# Patient Record
Sex: MALE | Race: WHITE | Employment: FULL TIME | URBAN - METROPOLITAN AREA
[De-identification: names, ages, dates, MRNs, and addresses within clinical notes are randomized per-mention and may not be internally consistent; named-entity substitution may affect disease eponyms.]

---

## 2017-09-27 PROCEDURE — 84403 ASSAY OF TOTAL TESTOSTERONE: CPT | Performed by: INTERNAL MEDICINE

## 2018-12-27 PROCEDURE — 86803 HEPATITIS C AB TEST: CPT | Performed by: INTERNAL MEDICINE

## 2020-05-15 PROCEDURE — 88305 TISSUE EXAM BY PATHOLOGIST: CPT | Performed by: INTERNAL MEDICINE

## 2022-03-08 ENCOUNTER — APPOINTMENT (OUTPATIENT)
Dept: OTHER | Facility: HOSPITAL | Age: 57
End: 2022-03-08
Attending: PREVENTIVE MEDICINE

## 2023-06-01 ENCOUNTER — LAB REQUISITION (OUTPATIENT)
Dept: LAB | Facility: HOSPITAL | Age: 58
End: 2023-06-01
Payer: COMMERCIAL

## 2023-06-01 DIAGNOSIS — K64.9 UNSPECIFIED HEMORRHOIDS: ICD-10-CM

## 2023-06-01 PROCEDURE — 88304 TISSUE EXAM BY PATHOLOGIST: CPT | Performed by: SURGERY

## 2024-02-27 ENCOUNTER — APPOINTMENT (OUTPATIENT)
Dept: ULTRASOUND IMAGING | Age: 59
End: 2024-02-27
Attending: EMERGENCY MEDICINE
Payer: COMMERCIAL

## 2024-02-27 ENCOUNTER — HOSPITAL ENCOUNTER (EMERGENCY)
Age: 59
Discharge: HOME | End: 2024-02-28
Attending: EMERGENCY MEDICINE
Payer: COMMERCIAL

## 2024-02-27 DIAGNOSIS — I82.812 ACUTE SUPERFICIAL VENOUS THROMBOSIS OF LEFT LOWER EXTREMITY: Primary | ICD-10-CM

## 2024-02-27 LAB
ALBUMIN SERPL-MCNC: 4 G/DL (ref 3.4–5)
ALBUMIN/GLOB SERPL: 1.3 {RATIO} (ref 1–2)
ALP LIVER SERPL-CCNC: 25 U/L
ALT SERPL-CCNC: 42 U/L
ANION GAP SERPL CALC-SCNC: 5 MMOL/L (ref 0–18)
AST SERPL-CCNC: 28 U/L (ref 15–37)
BASOPHILS # BLD AUTO: 0.03 X10(3) UL (ref 0–0.2)
BASOPHILS NFR BLD AUTO: 0.4 %
BILIRUB SERPL-MCNC: 0.4 MG/DL (ref 0.1–2)
BUN BLD-MCNC: 15 MG/DL (ref 9–23)
CALCIUM BLD-MCNC: 9.4 MG/DL (ref 8.5–10.1)
CHLORIDE SERPL-SCNC: 105 MMOL/L (ref 98–112)
CO2 SERPL-SCNC: 29 MMOL/L (ref 21–32)
CREAT BLD-MCNC: 1.56 MG/DL
EGFRCR SERPLBLD CKD-EPI 2021: 51 ML/MIN/1.73M2 (ref 60–?)
EOSINOPHIL # BLD AUTO: 0.16 X10(3) UL (ref 0–0.7)
EOSINOPHIL NFR BLD AUTO: 2.1 %
ERYTHROCYTE [DISTWIDTH] IN BLOOD BY AUTOMATED COUNT: 17.1 %
GLOBULIN PLAS-MCNC: 3.2 G/DL (ref 2.8–4.4)
GLUCOSE BLD-MCNC: 118 MG/DL (ref 70–99)
HCT VFR BLD AUTO: 49.5 %
HGB BLD-MCNC: 16.1 G/DL
IMM GRANULOCYTES # BLD AUTO: 0.02 X10(3) UL (ref 0–1)
IMM GRANULOCYTES NFR BLD: 0.3 %
INR BLD: 2.44 (ref 0.8–1.2)
LYMPHOCYTES # BLD AUTO: 1.62 X10(3) UL (ref 1–4)
LYMPHOCYTES NFR BLD AUTO: 21.1 %
MCH RBC QN AUTO: 26.6 PG (ref 26–34)
MCHC RBC AUTO-ENTMCNC: 32.5 G/DL (ref 31–37)
MCV RBC AUTO: 81.8 FL
MONOCYTES # BLD AUTO: 0.78 X10(3) UL (ref 0.1–1)
MONOCYTES NFR BLD AUTO: 10.2 %
NEUTROPHILS # BLD AUTO: 5.05 X10 (3) UL (ref 1.5–7.7)
NEUTROPHILS # BLD AUTO: 5.05 X10(3) UL (ref 1.5–7.7)
NEUTROPHILS NFR BLD AUTO: 65.9 %
OSMOLALITY SERPL CALC.SUM OF ELEC: 290 MOSM/KG (ref 275–295)
PLATELET # BLD AUTO: 235 10(3)UL (ref 150–450)
POTASSIUM SERPL-SCNC: 4 MMOL/L (ref 3.5–5.1)
PROT SERPL-MCNC: 7.2 G/DL (ref 6.4–8.2)
PROTHROMBIN TIME: 26.8 SECONDS (ref 11.6–14.8)
RBC # BLD AUTO: 6.05 X10(6)UL
SODIUM SERPL-SCNC: 139 MMOL/L (ref 136–145)
WBC # BLD AUTO: 7.7 X10(3) UL (ref 4–11)

## 2024-02-27 PROCEDURE — 99284 EMERGENCY DEPT VISIT MOD MDM: CPT

## 2024-02-27 PROCEDURE — 36415 COLL VENOUS BLD VENIPUNCTURE: CPT

## 2024-02-27 PROCEDURE — 93971 EXTREMITY STUDY: CPT | Performed by: EMERGENCY MEDICINE

## 2024-02-27 PROCEDURE — 99285 EMERGENCY DEPT VISIT HI MDM: CPT

## 2024-02-27 PROCEDURE — 85610 PROTHROMBIN TIME: CPT | Performed by: EMERGENCY MEDICINE

## 2024-02-27 PROCEDURE — 85025 COMPLETE CBC W/AUTO DIFF WBC: CPT | Performed by: EMERGENCY MEDICINE

## 2024-02-27 PROCEDURE — 80053 COMPREHEN METABOLIC PANEL: CPT | Performed by: EMERGENCY MEDICINE

## 2024-02-28 VITALS
TEMPERATURE: 98 F | DIASTOLIC BLOOD PRESSURE: 95 MMHG | OXYGEN SATURATION: 96 % | HEIGHT: 72 IN | WEIGHT: 295 LBS | SYSTOLIC BLOOD PRESSURE: 157 MMHG | RESPIRATION RATE: 18 BRPM | BODY MASS INDEX: 39.96 KG/M2 | HEART RATE: 74 BPM

## 2024-02-28 NOTE — ED PROVIDER NOTES
Patient Seen in: [facility] In [place]      History     Chief Complaint   Patient presents with    Leg or Foot Injury     Stated Complaint: left inner thigh pain.  History of clots secondary to clotting deficiencies.  P*    Subjective:   HPI    58-year-old male with past medical history of protein S deficiency on warfarin presents today for evaluation of left leg redness.  Patient had a venous seal with vascular surgery on February 15.  Over the last day, he noticed redness spreading from his upper calf into his left thigh.  He denies any fevers, chest pain or shortness of breath.    Objective:   Past Medical History:   Diagnosis Date    Blood glucose elevated     HYPERLIPIDEMIA     Metabolic syndrome     OTHER DISEASES     mother had PE/brother had DVT secondary to coagulopathy    Protein S deficiency (HCC)     on coumadin prophylatically, no personal history of clotting, pcp monitors              Past Surgical History:   Procedure Laterality Date    COLONOSCOPY N/A 4/4/2016    hepatic flexure polyp-adenoma, Procedure: COLONOSCOPY;  Surgeon: Blas Taylor MD;  Location:  ENDOSCOPY    COLONOSCOPY  05/15/2020    small hepatic flexure polyp    COLONOSCOPY N/A 5/15/2020    Procedure: COLONOSCOPY, POSSIBLE BIOPSY, POSSIBLE POLYPECTOMY 93771;  Surgeon: Blas Taylor MD;  [facility]    OTHER      deviated septal repair    OTHER SURGICAL HISTORY  2008    Dr. Briones, gastritis/duodenitis, gastric bx negative for H. pylori    OTHER SURGICAL HISTORY  10/24/2012    Scrotal US - Dr. Lucio     OTHER SURGICAL HISTORY      varicocele                Social History     Socioeconomic History    Marital status:    Tobacco Use    Smoking status: Never    Smokeless tobacco: Never   Vaping Use    Vaping Use: Never used   Substance and Sexual Activity    Alcohol use: Yes     Comment: beer or vodka 1x/week    Drug use: No   Other Topics Concern    Seat Belt Yes               Review of Systems    Positive for stated complaint: left inner thigh pain.  History of clots secondary to clotting deficiencies.  P*  Other systems are as noted in HPI.  Constitutional and vital signs reviewed.      All other systems reviewed and negative except as noted above.    Physical Exam     ED Triage Vitals [02/27/24 2155]   /90   Pulse 88   Resp 18   Temp 98.1 °F (36.7 °C)   Temp src Oral   SpO2 94 %   O2 Device None (Room air)       Current:BP (!) 157/95   Pulse 74   Temp 98.1 °F (36.7 °C) (Oral)   Resp 18   Ht 182.9 cm (6')   Wt 133.8 kg   SpO2 96%   BMI 40.01 kg/m²         Physical Exam  Vitals and nursing note reviewed.   Constitutional:       Appearance: Normal appearance.   HENT:      Head: Normocephalic.      Nose: Nose normal.      Mouth/Throat:      Mouth: Mucous membranes are moist.   Eyes:      Extraocular Movements: Extraocular movements intact.   Cardiovascular:      Rate and Rhythm: Normal rate.   Pulmonary:      Effort: Pulmonary effort is normal.   Abdominal:      General: Abdomen is flat.   Musculoskeletal:         General: Normal range of motion.   Skin:     General: Skin is warm.      Comments: Erythema noted over the proximal medial calf streaking upwards towards his left inner thigh.  No induration.  Skin is intact.   Neurological:      General: No focal deficit present.      Mental Status: He is alert.   Psychiatric:         Mood and Affect: Mood normal.           ED Course     Labs Reviewed   COMP METABOLIC PANEL (14) - Abnormal; Notable for the following components:       Result Value    Glucose 118 (*)     Creatinine 1.56 (*)     eGFR-Cr 51 (*)     Alkaline Phosphatase 25 (*)     All other components within normal limits   PROTHROMBIN TIME (PT) - Abnormal; Notable for the following components:    PT 26.8 (*)     INR 2.44 (*)     All other components within normal limits   CBC W/ DIFFERENTIAL - Abnormal; Notable for the following components:    RBC 6.05 (*)     All  other components within normal limits   CBC WITH DIFFERENTIAL WITH PLATELET    Narrative:     The following orders were created for panel order CBC With Differential With Platelet.  Procedure                               Abnormality         Status                     ---------                               -----------         ------                     CBC W/ DIFFERENTIAL[[id number]]          Abnormal            Final result                 Please view results for these tests on the individual orders.             US VENOUS DOPPLER LEG LEFT - DIAG IMG (CPT=93971)    Result Date: 2/28/2024  PROCEDURE:  US VENOUS DOPPLER LEG LEFT - DIAG IMG (CPT=93971)  COMPARISON:  None.  INDICATIONS:  eval for DVT  TECHNIQUE:  Real time, grey scale, and duplex ultrasound was used to evaluate the lower extremity venous system. B-mode two-dimensional images of the vascular structures, Doppler spectral analysis, and color flow.  Doppler imaging were performed.  The following veins were imaged:  Common, deep, and superficial femoral, popliteal, sapheno-femoral junction, posterior tibial veins, and the contralateral common femoral vein.  PATIENT STATED HISTORY: (As transcribed by Technologist)  Patient states he had a Venaseal procedure to his left leg two weeks ago. Had a follow-up ultrasound a few days later at the vein clinic and everything looked good. States he has new redness and a  \"burning\" like pain to the left thigh since last night. Family history of blood clots, currrently taking Warfarin.    FINDINGS:  The common femoral, profunda femoral, superficial femoral, popliteal posterior tibial veins are patent.  There is superficial thrombosis involving the greater saphenous vein from the midthigh to the proximal calf.            CONCLUSION:  1. No evidence of DVT in the left leg. 2. Superficial venous thrombosis involving greater saphenous vein from the midthigh to the proximal calf.   LOCATION:  [facility]    Dictated by (CST):  Neville Wan MD on 2/27/2024 at 11:59 PM     Finalized by (CST): Neville Wan MD on 2/28/2024 at 0:00 AM               [facility]      Differential Diagnosis  58-year-old male with known protein S deficiency anticoagulated on warfarin presents today for left leg redness.  The left lower extremity is warm and well-perfused.  There is no underlying induration.  The skin appears intact.  The erythema does not surround the area of puncture in the distal left calf.  Differential would include superficial venous thrombosis, DVT or cellulitis.  Plan for ultrasound along with labs.  Will observe    12:36 am  Patient noted to have a superficial venous thrombosis involving the greater saphenous vein from the mid thigh to the proximal calf.  I reviewed patient's recent procedure, clinical presentation along with ultrasound findings with Dr. Mason of vascular surgery.  He states this is an expected finding and presentation post procedurally.  He advised that patient wears compression stocking.  I reviewed results and vascular recommendations with patient.  He remains well-appearing.  He has a follow-up appointment in place later today and will follow-up with his vascular surgeon as planned for reevaluation.  He remains well-appearing at the time my reassessment.    External Chart Reviewed  I reviewed the office visit and procedure note from February 15    Discussions of Management  Case reviewed with vascular surgery                                   Medical Decision Making      Disposition and Plan     Clinical Impression:  1. Acute superficial venous thrombosis of left lower extremity         Disposition:  Discharge  2/28/2024 12:38 am    Follow-up:  Alexandro Bae MD  [address] [facility]  [address] [place] [id number]  [phone]    Call in 1 day(s)            Medications Prescribed:  Discharge Medication List as of 2/28/2024 12:39 AM

## 2024-02-28 NOTE — ED INITIAL ASSESSMENT (HPI)
Patient has pain and redness in left inner thigh, started last night.  Has hx of blood clots secondary to clotting issues.  Is on a blood thinner